# Patient Record
Sex: FEMALE | Race: WHITE | NOT HISPANIC OR LATINO | Employment: UNEMPLOYED | URBAN - METROPOLITAN AREA
[De-identification: names, ages, dates, MRNs, and addresses within clinical notes are randomized per-mention and may not be internally consistent; named-entity substitution may affect disease eponyms.]

---

## 2019-04-25 ENCOUNTER — OFFICE VISIT (OUTPATIENT)
Dept: OBSTETRICS AND GYNECOLOGY | Facility: CLINIC | Age: 29
End: 2019-04-25
Payer: MEDICAID

## 2019-04-25 VITALS
SYSTOLIC BLOOD PRESSURE: 110 MMHG | HEIGHT: 64 IN | WEIGHT: 165.13 LBS | DIASTOLIC BLOOD PRESSURE: 76 MMHG | BODY MASS INDEX: 28.19 KG/M2

## 2019-04-25 DIAGNOSIS — Z01.419 ENCOUNTER FOR GYNECOLOGICAL EXAMINATION (GENERAL) (ROUTINE) WITHOUT ABNORMAL FINDINGS: Primary | ICD-10-CM

## 2019-04-25 DIAGNOSIS — R68.82 DECREASED LIBIDO: ICD-10-CM

## 2019-04-25 DIAGNOSIS — Z12.4 SCREENING FOR CERVICAL CANCER: ICD-10-CM

## 2019-04-25 PROCEDURE — 99999 PR PBB SHADOW E&M-EST. PATIENT-LVL III: ICD-10-PCS | Mod: PBBFAC,,, | Performed by: OBSTETRICS & GYNECOLOGY

## 2019-04-25 PROCEDURE — 99385 PREV VISIT NEW AGE 18-39: CPT | Mod: S$PBB,,, | Performed by: OBSTETRICS & GYNECOLOGY

## 2019-04-25 PROCEDURE — 99385 PR PREVENTIVE VISIT,NEW,18-39: ICD-10-PCS | Mod: S$PBB,,, | Performed by: OBSTETRICS & GYNECOLOGY

## 2019-04-25 PROCEDURE — 99999 PR PBB SHADOW E&M-EST. PATIENT-LVL III: CPT | Mod: PBBFAC,,, | Performed by: OBSTETRICS & GYNECOLOGY

## 2019-04-25 PROCEDURE — 99213 OFFICE O/P EST LOW 20 MIN: CPT | Mod: PBBFAC,PN | Performed by: OBSTETRICS & GYNECOLOGY

## 2019-04-25 RX ORDER — MONTELUKAST SODIUM 10 MG/1
10 TABLET ORAL DAILY
Refills: 3 | COMMUNITY
Start: 2019-04-04 | End: 2021-03-08

## 2019-04-25 RX ORDER — FLUTICASONE PROPIONATE 50 MCG
SPRAY, SUSPENSION (ML) NASAL
Refills: 1 | COMMUNITY
Start: 2019-04-04 | End: 2021-03-08

## 2019-04-25 RX ORDER — CETIRIZINE HYDROCHLORIDE 10 MG/1
10 TABLET ORAL DAILY
Refills: 2 | COMMUNITY
Start: 2019-04-04 | End: 2021-03-08

## 2019-04-25 NOTE — PROGRESS NOTES
Subjective:       Patient ID: Merle Hair is a 28 y.o. female.    Chief Complaint:  Well Woman      History of Present Illness  HPI  Annual Exam-Postmenopausal  Patient presents for annual exam. The patient has no complaints today. The patient is sexually active--denies pelvic pain, libido low. GYN screening history: last pap: was normal and patient does not recall when last pap was. The patient has never been taking hormone replacement therapy. Patient denies post-menopausal vaginal bleeding. The patient wears seatbelts: yes. The patient participates in regular exercise: yes--active; . Has the patient ever been transfused or tattooed?: yes. --tattooes; The patient reports that there is not domestic violence in her life.      Denies hot flushes;       Urinary leakage after cough/sneeze        GYN & OB History  Patient's last menstrual period was 10/01/2016 (approximate).   Date of Last Pap: No result found    OB History    Para Term  AB Living   3 3 3     3   SAB TAB Ectopic Multiple Live Births           3      # Outcome Date GA Lbr Andre/2nd Weight Sex Delivery Anes PTL Lv   3 Term 13    F Vag-Spont   YOBANY   2 Term 12/02/10    M Vag-Spont   YOBANY   1 Term 08    M    YOBANY       Review of Systems  Review of Systems   Genitourinary: Positive for decreased libido.   All other systems reviewed and are negative.          Objective:      Physical Exam:   Constitutional: She appears well-developed.     Eyes: Pupils are equal, round, and reactive to light. Conjunctivae and EOM are normal.    Neck: Normal range of motion. Neck supple.     Pulmonary/Chest: Effort normal. Right breast exhibits no mass, no nipple discharge, no skin change and no tenderness. Left breast exhibits no mass, no nipple discharge, no skin change and no tenderness. Breasts are symmetrical.        Abdominal: Soft.     Genitourinary: Rectum normal and vagina normal. Pelvic exam was performed with patient supine. Uterus is  absent. Right adnexum displays no mass and no tenderness. Left adnexum displays no mass and no tenderness. No erythema, bleeding, rectocele, cystocele or unspecified prolapse of vaginal walls in the vagina. No vaginal discharge found. Vaginal cuff normal.Labial bartholins normal.Cervix exhibits absence. Also,  no recent pap smear            Musculoskeletal: Normal range of motion.       Neurological: She is alert.    Skin: Skin is warm.    Psychiatric: She has a normal mood and affect.           Assessment:        Encounter Diagnoses   Name Primary?    Encounter for gynecological examination (general) (routine) without abnormal findings Yes    Screening for cervical cancer     Decreased libido                Plan:      Continue annual well woman exam.  Pap not indicated due to hx of nml pap and hx of amber  Reviewed options for decreased libido  Continue diet, exercise, weight loss  Aware mammo due age 40

## 2021-03-08 ENCOUNTER — OFFICE VISIT (OUTPATIENT)
Dept: OBSTETRICS AND GYNECOLOGY | Facility: CLINIC | Age: 31
End: 2021-03-08
Payer: MEDICAID

## 2021-03-08 ENCOUNTER — LAB VISIT (OUTPATIENT)
Dept: LAB | Facility: HOSPITAL | Age: 31
End: 2021-03-08
Attending: OBSTETRICS & GYNECOLOGY
Payer: MEDICAID

## 2021-03-08 VITALS
DIASTOLIC BLOOD PRESSURE: 60 MMHG | WEIGHT: 182 LBS | HEIGHT: 64 IN | SYSTOLIC BLOOD PRESSURE: 102 MMHG | BODY MASS INDEX: 31.07 KG/M2

## 2021-03-08 DIAGNOSIS — Z72.51 HIGH RISK HETEROSEXUAL BEHAVIOR: ICD-10-CM

## 2021-03-08 DIAGNOSIS — Z01.419 ENCOUNTER FOR GYNECOLOGICAL EXAMINATION (GENERAL) (ROUTINE) WITHOUT ABNORMAL FINDINGS: Primary | ICD-10-CM

## 2021-03-08 DIAGNOSIS — Z12.4 SCREENING FOR CERVICAL CANCER: ICD-10-CM

## 2021-03-08 DIAGNOSIS — N76.0 BACTERIAL VAGINITIS: ICD-10-CM

## 2021-03-08 DIAGNOSIS — B96.89 BACTERIAL VAGINITIS: ICD-10-CM

## 2021-03-08 PROCEDURE — 36415 COLL VENOUS BLD VENIPUNCTURE: CPT | Mod: PO | Performed by: OBSTETRICS & GYNECOLOGY

## 2021-03-08 PROCEDURE — 99999 PR PBB SHADOW E&M-EST. PATIENT-LVL III: CPT | Mod: PBBFAC,,, | Performed by: OBSTETRICS & GYNECOLOGY

## 2021-03-08 PROCEDURE — 99395 PR PREVENTIVE VISIT,EST,18-39: ICD-10-PCS | Mod: S$PBB,,, | Performed by: OBSTETRICS & GYNECOLOGY

## 2021-03-08 PROCEDURE — 99213 OFFICE O/P EST LOW 20 MIN: CPT | Mod: PBBFAC,PN | Performed by: OBSTETRICS & GYNECOLOGY

## 2021-03-08 PROCEDURE — 99999 PR PBB SHADOW E&M-EST. PATIENT-LVL III: ICD-10-PCS | Mod: PBBFAC,,, | Performed by: OBSTETRICS & GYNECOLOGY

## 2021-03-08 PROCEDURE — 86703 HIV-1/HIV-2 1 RESULT ANTBDY: CPT | Performed by: OBSTETRICS & GYNECOLOGY

## 2021-03-08 PROCEDURE — 80074 ACUTE HEPATITIS PANEL: CPT | Performed by: OBSTETRICS & GYNECOLOGY

## 2021-03-08 PROCEDURE — 99395 PREV VISIT EST AGE 18-39: CPT | Mod: S$PBB,,, | Performed by: OBSTETRICS & GYNECOLOGY

## 2021-03-08 PROCEDURE — 86592 SYPHILIS TEST NON-TREP QUAL: CPT | Performed by: OBSTETRICS & GYNECOLOGY

## 2021-03-08 RX ORDER — METRONIDAZOLE 500 MG/1
500 TABLET ORAL EVERY 12 HOURS
Qty: 14 TABLET | Refills: 0 | Status: SHIPPED | OUTPATIENT
Start: 2021-03-08 | End: 2021-03-15

## 2021-03-09 LAB — RPR SER QL: NORMAL

## 2021-03-10 LAB
HAV IGM SERPL QL IA: NEGATIVE
HBV CORE IGM SERPL QL IA: NEGATIVE
HBV SURFACE AG SERPL QL IA: NEGATIVE
HCV AB SERPL QL IA: NEGATIVE
HIV 1+2 AB+HIV1 P24 AG SERPL QL IA: NEGATIVE

## 2021-04-29 ENCOUNTER — PATIENT MESSAGE (OUTPATIENT)
Dept: RESEARCH | Facility: HOSPITAL | Age: 31
End: 2021-04-29